# Patient Record
Sex: MALE | URBAN - METROPOLITAN AREA
[De-identification: names, ages, dates, MRNs, and addresses within clinical notes are randomized per-mention and may not be internally consistent; named-entity substitution may affect disease eponyms.]

---

## 2018-06-05 ENCOUNTER — APPOINTMENT (RX ONLY)
Dept: URBAN - METROPOLITAN AREA CLINIC 23 | Facility: CLINIC | Age: 58
Setting detail: DERMATOLOGY
End: 2018-06-05

## 2018-06-05 DIAGNOSIS — L82.0 INFLAMED SEBORRHEIC KERATOSIS: ICD-10-CM

## 2018-06-05 DIAGNOSIS — L57.0 ACTINIC KERATOSIS: ICD-10-CM

## 2018-06-05 DIAGNOSIS — Z87.2 PERSONAL HISTORY OF DISEASES OF THE SKIN AND SUBCUTANEOUS TISSUE: ICD-10-CM

## 2018-06-05 DIAGNOSIS — Z41.9 ENCOUNTER FOR PROCEDURE FOR PURPOSES OTHER THAN REMEDYING HEALTH STATE, UNSPECIFIED: ICD-10-CM

## 2018-06-05 PROCEDURE — ? LIQUID NITROGEN

## 2018-06-05 PROCEDURE — 99202 OFFICE O/P NEW SF 15 MIN: CPT

## 2018-06-05 PROCEDURE — ? FILLERS

## 2018-06-05 PROCEDURE — ? DYSPORT

## 2018-06-05 PROCEDURE — ? BENIGN DESTRUCTION

## 2018-06-05 PROCEDURE — 17110 DESTRUCTION B9 LES UP TO 14: CPT | Mod: NC

## 2018-06-05 PROCEDURE — ? SUNSCREEN RECOMMENDATIONS

## 2018-06-05 PROCEDURE — 17000 DESTRUCT PREMALG LESION: CPT | Mod: NC

## 2018-06-05 ASSESSMENT — LOCATION ZONE DERM
LOCATION ZONE: EYELID
LOCATION ZONE: NOSE

## 2018-06-05 ASSESSMENT — LOCATION DETAILED DESCRIPTION DERM
LOCATION DETAILED: LEFT MEDIAL SUPERIOR EYELID
LOCATION DETAILED: NASAL TIP

## 2018-06-05 ASSESSMENT — LOCATION SIMPLE DESCRIPTION DERM
LOCATION SIMPLE: NOSE
LOCATION SIMPLE: LEFT SUPERIOR EYELID

## 2018-06-05 NOTE — PROCEDURE: FILLERS
Nasolabial Folds Filler Volume In Cc: 0
Additional Area 2 Location: lateral cheeks, lateral mouth, marionette lines
Expiration Date (Month Year): 07/225/2019
Additional Area 1 Location: perioral fine lines, NLF's, marionette lines
Additional Area 1 Volume In Cc: 0.5
Lot #: [id number]
Map Statment: See [address] for Complete Details
Detail Level: Zone
Additional Area 3 Location: lateral mouth, perioral fine lines
Expiration Date (Month Year): 10/31/2020
Lot #: [id number]
Additional Anesthesia Volume In Cc: 6
Additional Area 4 Location: jawline, fine lines lateral cheeks
Use Map Statement For Sites (Optional): No
Additional Area 5 Location: cheeks with cannula, lateral mouth
Price (Use Numbers Only, No Special Characters Or $): 0.00
Post-Care Instructions: Patient instructed to apply ice to reduce swelling.
Additional Area 1 Location: periorbital fine lines
Filler: Eleni Bates
Anesthesia Volume In Cc: 0.2
Consent: Written consent obtained. Risks include but not limited to bruising, beading, irregular texture, ulceration, infection, allergic reaction, scar formation, incomplete augmentation, temporary nature, procedural pain.

## 2018-06-05 NOTE — PROCEDURE: BENIGN DESTRUCTION
Include Z78.9 (Other Specified Conditions Influencing Health Status) As An Associated Diagnosis?: No
Post-Care Instructions: I reviewed with the patient in detail post-care instructions. Patient is to wear sunprotection, and avoid picking at any of the treated lesions. Pt may apply Vaseline to crusted or scabbing areas.
Treatment Number (Will Not Render If 0): 0
Anesthesia Volume In Cc: 0.5
Medical Necessity Clause: This procedure was medically necessary because the lesions that were treated were:
Consent: The patient's consent was obtained including but not limited to risks of crusting, scabbing, blistering, scarring, darker or lighter pigmentary change, recurrence, incomplete removal and infection.
Detail Level: Zone
Medical Necessity Information: It is in your best interest to select a reason for this procedure from the list below. All of these items fulfill various CMS LCD requirements except the new and changing color options.

## 2018-06-05 NOTE — PROCEDURE: SUNSCREEN RECOMMENDATIONS

## 2018-06-05 NOTE — PROCEDURE: DYSPORT
Nasal Root Units: 0
Topical Anesthesia?: 3% lidocaine, 6% prilocaine
Additional Area 4 Location: high forehead
Length Of Topical Anesthesia Application (Optional): 15 minutes
Additional Area 3 Location: lateral brows
Detail Level: Simple
Lot #: [id number]
Additional Area 1 Location: lateral eyes
Consent: Written consent obtained. Risks include but not limited to lid/brow ptosis, bruising, swelling, diplopia, temporary effect, incomplete chemical denervation.
Dilution (U/ 0.1cc): 1.5
Additional Area 2 Location: bunny lines
Expiration Date (Month Year): 11/30/2018
Additional Area 1 Units: [address] [facility]
Price (Use Numbers Only, No Special Characters Or $): 0.00
Additional Area 6 Location: neck

## 2019-02-13 ENCOUNTER — APPOINTMENT (RX ONLY)
Dept: URBAN - METROPOLITAN AREA CLINIC 23 | Facility: CLINIC | Age: 59
Setting detail: DERMATOLOGY
End: 2019-02-13

## 2019-02-13 DIAGNOSIS — Z41.9 ENCOUNTER FOR PROCEDURE FOR PURPOSES OTHER THAN REMEDYING HEALTH STATE, UNSPECIFIED: ICD-10-CM

## 2019-02-13 PROCEDURE — ? DYSPORT

## 2019-02-13 PROCEDURE — ? PULSED-DYE LASER

## 2019-02-13 PROCEDURE — ? FILLERS

## 2019-02-13 PROCEDURE — ? RECOMMENDATIONS

## 2019-02-13 NOTE — PROCEDURE: FILLERS
Include Cannula Information In Note?: No
[place] Filler  Volume In Cc: 0
Additional Area 3 Location: lateral cheeks, Nasalabial folds, lateral mouth, Marionette fine lines
Additional Area 1 Location: lateral mouth, lateral cheeks, marionette lines, perioral fine lines
Additional Area 2 Location: vermillion fine lines, nasalabial folds,oral commissure
Additional Area 4 Location: lateral Jawline, Vermillion lip fine lines, tear trough, Nasalabial folds and Chin
Additional Area 1 Location: lateral mouth, fine lines perioral, marionette lines, lateral cheeks, vermillion lips
Anesthesia Type: 1% lidocaine without epinephrine
Additional Area 5 Location: Chvermillion lips, lateral mouth
Additional Area 3 Location: Glabbellar fine lines, Nasalabial folds, Marionette lines, vermillion lip
Additional Area 2 Location: Nasalabial, Glabellar fine lines- Complimentary
Anesthesia Volume In Cc: 0.6
Lot #: [id number]
Additional Anesthesia Volume In Cc: 6
Expiration Date (Month Year): 04/30/2021
Detail Level: Zone
Include Cannula Information In Note?: Yes
Consent: Written consent obtained. Risks include but not limited to bruising, beading, irregular texture, ulceration, infection, allergic reaction, scar formation, incomplete augmentation, temporary nature, procedural pain.
Lot #: [id number]
Include Cannula Brand?: DermaSculpt
Price (Use Numbers Only, No Special Characters Or $): 0.00
Post-Care Instructions: Patient instructed to apply ice to reduce swelling.
Expiration Date (Month Year): 07/31/2021
Lot #: [id number]
Filler: Restylane-L
Include Cannula Size?: 25G
Additional Area 1 Location: tear throughs bilateral
Expiration Date (Month Year): 08/31/2019
Additional Area 1 Volume In Cc: 0.5
Include Cannula Length?: 1.5 inch
Map Statment: See [address] for Complete Details
Additional Area 2 Location: glabella

## 2019-02-13 NOTE — PROCEDURE: DYSPORT
Periorbital Skin Units: 0
Additional Area 5 Location: upper lip cutaneous
Additional Area 2 Units: [address] [facility]
Additional Area 2 Location: glabella
Dilution (U/ 0.1cc): 1.5
Additional Area 6 Location: high forehead 2 cm above brows
Additional Area 3 Location: neck bands
Consent: Written consent obtained. Risks include but not limited to lid/brow ptosis, bruising, swelling, diplopia, temporary effect, incomplete chemical denervation.
Price (Use Numbers Only, No Special Characters Or $): 0.00
Expiration Date (Month Year): 08/31/2019
Lot #: [id number]
Length Of Topical Anesthesia Application (Optional): 15 minutes
Additional Area 1 Units: 60
Detail Level: Simple
Topical Anesthesia?: BLT cream (benzocaine 20%, lidocaine 10%, tetracaine 10%)
Additional Area 1 Location: crows feet

## 2019-02-13 NOTE — HPI: COSMETIC (FILLERS)
Have You Had Fillers Before?: has had fillers
Additional History: No history of cold sores
When Was Your Last Filler Injection?: 06/05/18

## 2019-02-13 NOTE — PROCEDURE: PULSED-DYE LASER
Treated Area: small area
Fluence In J/Cm2 (Optional): 10.0
Delay Time (Ms): 4[address] [facility]
Cryogen Time (Ms): 10
Consent: Written consent obtained, risks reviewed including but not limited to crusting, scabbing, blistering, scarring, darker or lighter pigmentary change, incidental hair removal, bruising, and/or incomplete removal.
Delay Time (Ms): 20
Post-Procedure Care: Sunscreen applied. Post care reviewed with patient.
Spot Size: 10x3 mm
Fluence In J/Cm2 (Optional): 6.50
Spot Size: 7 mm
Location Override: cheeks tear through area post cosmetic
Fluence In J/Cm2 (Optional): 7.50
Pulse Duration: 10 ms
Cryogen Time (Ms): [address] Yey[address] Cardenas
Laser Type: Vbeam 595nm
Post-Care Instructions: I reviewed with the patient in detail post-care instructions. Patient should stay away from the sun and wear sun protection until treated areas are fully healed.
Price (Use Numbers Only, No Special Characters Or $): 0.00
Spot Size: 10 mm
Detail Level: Zone
Cryogen Time (Ms): 27
Cryogen Time (Ms): 30
Pulse Duration: 6 ms

## 2019-02-13 NOTE — PROCEDURE: MIPS QUALITY
Quality 110: Preventive Care And Screening: Influenza Immunization: Influenza Immunization not Administered due to Patient Allergy.
Detail Level: Zone
Quality 474: Zoster Vaccination Status: Shingrix Vaccination not Administered or Previously Received, Reason not Otherwise Specified
Quality 130: Documentation Of Current Medications In The Medical Record: Current Medications Documented

## 2019-02-14 ENCOUNTER — APPOINTMENT (RX ONLY)
Dept: URBAN - METROPOLITAN AREA CLINIC 23 | Facility: CLINIC | Age: 59
Setting detail: DERMATOLOGY
End: 2019-02-14

## 2019-02-14 DIAGNOSIS — Z41.9 ENCOUNTER FOR PROCEDURE FOR PURPOSES OTHER THAN REMEDYING HEALTH STATE, UNSPECIFIED: ICD-10-CM

## 2019-02-14 PROCEDURE — ? PULSED-DYE LASER

## 2019-02-14 NOTE — PROCEDURE: PULSED-DYE LASER
Spot Size: 7 mm
Detail Level: Zone
Fluence In J/Cm2 (Optional): 6.5 j/cm2
Consent: Written consent obtained, risks reviewed including but not limited to crusting, scabbing, blistering, scarring, darker or lighter pigmentary change, incidental hair removal, bruising, and/or incomplete removal.
Delay Time (Ms): 4[address] [facility]
Pulse Duration: 10 ms
Pulse Duration: 6 ms
Post-Procedure Care: Vaseline and ice applied. Post care reviewed with patient.
Spot Size: 10mm pigmented lesion handpiece
Cryogen Time (Ms): [address]0143 Yey[address] Cardenas
Cryogen Time (Ms): 30
Delay Time (Ms): 20
Post-Care Instructions: I reviewed with the patient in detail post-care instructions. Patient should stay away from the sun and wear sun protection until treated areas are fully healed.
Treated Area: small area
Location (Required For Details To Render In Note But Body Touch Will Also Count For First Location): lower lids
Laser Type: Vbeam 595nm

## 2019-07-11 ENCOUNTER — RX ONLY (OUTPATIENT)
Age: 59
Setting detail: RX ONLY
End: 2019-07-11

## 2019-07-11 ENCOUNTER — APPOINTMENT (RX ONLY)
Dept: URBAN - METROPOLITAN AREA CLINIC 23 | Facility: CLINIC | Age: 59
Setting detail: DERMATOLOGY
End: 2019-07-11

## 2019-07-11 DIAGNOSIS — Z41.9 ENCOUNTER FOR PROCEDURE FOR PURPOSES OTHER THAN REMEDYING HEALTH STATE, UNSPECIFIED: ICD-10-CM

## 2019-07-11 PROCEDURE — ? FILLERS

## 2019-07-11 PROCEDURE — ? DYSPORT

## 2019-07-11 RX ORDER — DICLOFENAC SODIUM 30 MG/G
GEL TOPICAL
Qty: 1 | Refills: 1 | Status: ACTIVE

## 2019-07-11 NOTE — PROCEDURE: DYSPORT
Nasal Root Units: 0
Additional Area 3 Units: [address]
Expiration Date (Month Year): 1/31/2020
Detail Level: Simple
Additional Area 2 Units: [address] [facility]
Dilution (U/ 0.1cc): 1.5
Additional Area 5 Location: glabella
Additional Area 4 Location: 2cm high forehead
Additional Area 1 Units: 50
Consent: Written consent obtained. Risks include but not limited to lid/brow ptosis, bruising, swelling, diplopia, temporary effect, incomplete chemical denervation.
Price (Use Numbers Only, No Special Characters Or $): 0.00
Additional Area 3 Location: high forehead 3 cm above brows
Lot #: [id number]
Additional Area 1 Location: lateral eyes

## 2019-07-11 NOTE — PROCEDURE: FILLERS
Additional Area 3 Volume In Cc: 0
Additional Anesthesia Volume In Cc: 6
Additional Area 1 Location: lateral mouth, perioral fine lines, vermillion lips, marionette lines
Include Cannula Length?: 1.5 inch
Additional Area 1 Location: lateral mouth, fine lines perioral, marionette lines, lateral cheeks, vermillion lips
Additional Area 1 Location: lateral mouth, marionette lines
Detail Level: Zone
Additional Area 1 Volume In Cc: 0.5
Additional Area 2 Location: Nasalabial, Glabellar fine lines- Complimentary
Additional Area 2 Location: cheeks, jawline, oral commissure
Additional Area 2 Location: Lips, oral commissure, glabellar fine fines
Price (Use Numbers Only, No Special Characters Or $): 0.00
Additional Area 3 Location: Glabbellar fine lines, Nasalabial folds, Marionette lines, vermillion lip
Use Map Statement For Sites (Optional): No
Filler: Restylane-L
Additional Area 4 Location: Perioral
Additional Area 4 Location: cheeks, lateral jawline, medial cheeks fine lines
Additional Area 5 Location: Cheeks, vermillion lips, marionette fine lines, glabellar fine lines, lateral mouth
Lot #: [id number]
Lot #: [id number]
Lot #: [id number]
Map Statment: See [address] for Complete Details
Expiration Date (Month Year): 08/31/2019
Expiration Date (Month Year): 2021-07-31
Expiration Date (Month Year): 09/2021
Anesthesia Type: 1% lidocaine without epinephrine
Include Cannula Brand?: DermaSculpt
Consent: Written consent obtained. Risks include but not limited to bruising, beading, irregular texture, ulceration, infection, allergic reaction, scar formation, incomplete augmentation, temporary nature, procedural pain.
Post-Care Instructions: Patient instructed to apply ice to reduce swelling.
Include Cannula Size?: 25G

## 2019-07-11 NOTE — PROCEDURE: MIPS QUALITY
Detail Level: Detailed
Quality 131: Pain Assessment And Follow-Up: Pain assessment using a standardized tool is documented as negative, no follow-up plan required
Quality 130: Documentation Of Current Medications In The Medical Record: Eligible clinician attests to documenting in the medical record the patient is not eligible for a current list of medications being obtained, updated, or reviewed by the eligible clinician
Additional Notes: 0/10 pain level

## 2019-07-18 ENCOUNTER — APPOINTMENT (RX ONLY)
Dept: URBAN - METROPOLITAN AREA CLINIC 23 | Facility: CLINIC | Age: 59
Setting detail: DERMATOLOGY
End: 2019-07-18

## 2019-07-18 DIAGNOSIS — L57.0 ACTINIC KERATOSIS: ICD-10-CM

## 2019-07-18 PROCEDURE — 96573 PDT DSTR PRMLG LES PHYS/QHP: CPT

## 2019-07-18 PROCEDURE — ? PDT: BLUE

## 2019-07-18 ASSESSMENT — LOCATION ZONE DERM: LOCATION ZONE: FACE

## 2019-07-18 ASSESSMENT — LOCATION DETAILED DESCRIPTION DERM: LOCATION DETAILED: LEFT INFERIOR CENTRAL MALAR CHEEK

## 2019-07-18 ASSESSMENT — LOCATION SIMPLE DESCRIPTION DERM: LOCATION SIMPLE: LEFT CHEEK

## 2019-07-18 NOTE — PROCEDURE: PDT: BLUE
Medical Necessity: Precancerous Lesions
Illumination Time: 16:40
Pre-Procedure Text: The treatment areas were cleaned and prepped in the usual fashion.
Ndc# (Optional): [id number]
Was Levulan/Ameluz Applied On A Previous Day?: No
Consent: Written consent obtained. The risks were reviewed with the patient including but not limited to: pigmentary changes, pain, blistering, scabbing, redness, and the remote possibility of scarring.
Incubation Time: 01:30
Treatment Number: 0
Post-Care Instructions: I reviewed with the patient in detail post-care instructions. Patient is to avoid sunlight for the next 2 days, and wear sun protection. Patients may expect sunburn like redness, discomfort and scabbing.
Detail Level: Zone
Light Source: Brett-U
Show Anesthesia In Plan?: Yes
Number Of Kerasticks/Tubes Billed For: 1
Who Performed The Pdt?: Performed by MD EARNEST, RASHAD or OLEGARIO ([id number])
Anesthesia Type: 1% lidocaine with epinephrine
Which Photosensitizer Was Used: Levulan
Lot # (Optional): [id number]
Expiration Date (Optional): 11/20

## 2019-07-18 NOTE — PROCEDURE: MIPS QUALITY
Quality 131: Pain Assessment And Follow-Up: Pain assessment using a standardized tool is documented as negative, no follow-up plan required
Quality 130: Documentation Of Current Medications In The Medical Record: Eligible clinician attests to documenting in the medical record the patient is not eligible for a current list of medications being obtained, updated, or reviewed by the eligible clinician
Detail Level: Detailed
Additional Notes: 0/10 pain level\\nPt will bring med list

## 2019-10-22 ENCOUNTER — APPOINTMENT (RX ONLY)
Dept: URBAN - METROPOLITAN AREA CLINIC 23 | Facility: CLINIC | Age: 59
Setting detail: DERMATOLOGY
End: 2019-10-22

## 2019-10-22 DIAGNOSIS — Z41.9 ENCOUNTER FOR PROCEDURE FOR PURPOSES OTHER THAN REMEDYING HEALTH STATE, UNSPECIFIED: ICD-10-CM

## 2019-10-22 DIAGNOSIS — L57.0 ACTINIC KERATOSIS: ICD-10-CM

## 2019-10-22 PROCEDURE — ? DYSPORT

## 2019-10-22 PROCEDURE — ? FILLERS

## 2019-10-22 PROCEDURE — 17000 DESTRUCT PREMALG LESION: CPT

## 2019-10-22 PROCEDURE — ? LIQUID NITROGEN

## 2019-10-22 ASSESSMENT — LOCATION DETAILED DESCRIPTION DERM: LOCATION DETAILED: RIGHT MEDIAL ZYGOMA

## 2019-10-22 ASSESSMENT — LOCATION SIMPLE DESCRIPTION DERM: LOCATION SIMPLE: RIGHT ZYGOMA

## 2019-10-22 ASSESSMENT — LOCATION ZONE DERM: LOCATION ZONE: FACE

## 2019-10-22 NOTE — PROCEDURE: MIPS QUALITY
Detail Level: Detailed
Quality 131: Pain Assessment And Follow-Up: Pain assessment using a standardized tool is documented as negative, no follow-up plan required
Quality 130: Documentation Of Current Medications In The Medical Record: Current Medications Documented
Additional Notes: Pain level 0/10

## 2019-10-22 NOTE — PROCEDURE: FILLERS
Anesthesia Type: 1% lidocaine without epinephrine
Lateral Face Filler  Volume In Cc: 0
Lot #: [id number]
Include Cannula Brand?: DermaSculpt
Expiration Date (Month Year): 08/31/2019
Include Cannula Size?: 25G
Include Cannula Information In Note?: No
Include Cannula Length?: 1.5 inch
Additional Anesthesia Volume In Cc: 6
Additional Area 1 Location: lateral mouth, perioral fine lines, vermillion lips, marionette lines
Additional Area 1 Location: lateral mouth , Rt cheek
Additional Area 1 Volume In Cc: 0.5
Additional Area 2 Location: Lips, oral commissure, glabellar fine fines
Consent: Written consent obtained. Risks include but not limited to bruising, beading, irregular texture, ulceration, infection, allergic reaction, scar formation, incomplete augmentation, temporary nature, procedural pain.
Additional Area 2 Location: cheeks, jawline, oral commissure
Additional Area 3 Location: Glabbellar fine lines, Nasalabial folds, Marionette lines, vermillion lip
Post-Care Instructions: Patient instructed to apply ice to reduce swelling.
Additional Area 1 Location: lateral mouth, fine lines perioral, marionette lines, lateral cheeks, vermillion lips
Filler: Restylane-L
Detail Level: Zone
Additional Area 4 Location: Perioral
Additional Area 4 Location: cheeks, lateral jawline, medial cheeks fine lines
Additional Area 2 Location: Nasalabial, Glabellar fine lines- Complimentary
Price (Use Numbers Only, No Special Characters Or $): 0.00
Additional Area 5 Location: Cheeks, vermillion lips, marionette fine lines, glabellar fine lines, lateral mouth
Lot #: [id number]
Lot #: [id number]
Expiration Date (Month Year): 09/2021
Expiration Date (Month Year): 10/31/21
Map Statment: See [address] for Complete Details

## 2019-10-22 NOTE — PROCEDURE: DYSPORT
Lot #: [id number]
Additional Area 1 Location: lat brow
Nasal Root Units: 0
Periorbital Skin Units: [address] [facility]
Forehead Units: 60
Additional Area 3 Location: high forehead 3 cm above brows
Additional Area 2 Location: lateral brows
Additional Area 4 Location: 2cm high forehead
Consent: Written consent obtained. Risks include but not limited to lid/brow ptosis, bruising, swelling, diplopia, temporary effect, incomplete chemical denervation.
Price (Use Numbers Only, No Special Characters Or $): 0.00
Expiration Date (Month Year): 4/30/2020
Detail Level: Simple
Dilution (U/ 0.1cc): 1.5
Additional Area 5 Location: glabella

## 2020-02-11 ENCOUNTER — APPOINTMENT (RX ONLY)
Dept: URBAN - METROPOLITAN AREA CLINIC 23 | Facility: CLINIC | Age: 60
Setting detail: DERMATOLOGY
End: 2020-02-11

## 2020-02-11 DIAGNOSIS — Z41.9 ENCOUNTER FOR PROCEDURE FOR PURPOSES OTHER THAN REMEDYING HEALTH STATE, UNSPECIFIED: ICD-10-CM

## 2020-02-11 PROCEDURE — ? DYSPORT

## 2020-02-11 PROCEDURE — ? FILLERS

## 2020-02-11 NOTE — PROCEDURE: DYSPORT
Dilution (U/ 0.1cc): 1.5
Price (Use Numbers Only, No Special Characters Or $): 0.00
Consent: Written consent obtained. Risks include but not limited to lid/brow ptosis, bruising, swelling, diplopia, temporary effect, incomplete chemical denervation.
Additional Area 1 Units: 0
Periorbital Skin Units: [address] [facility]
Glabellar Complex Units: 60
Detail Level: Simple
Additional Area 3 Location: high forehead 3 cm above brows
Additional Area 4 Location: 2cm high forehead
Expiration Date (Month Year): 4/30/2020
Additional Area 5 Location: glabella
Additional Area 1 Location: lat brow
Lot #: [id number]
Additional Area 2 Location: lateral brows

## 2020-02-11 NOTE — PROCEDURE: FILLERS
Decollete Filler  Volume In Cc: 0
Include Cannula Brand?: DermaSculpt
Include Cannula Information In Note?: No
Additional Area 1 Location: lateral mouth, perioral fine lines, vermillion lips, marionette lines
Additional Area 2 Location: cheeks, jawline, oral commissure
Additional Area 2 Location: Lips, oral commissure, glabellar fine fines
Additional Area 1 Location: lateral jawline, lateral cheeks, NLFâs
Expiration Date (Month Year): 08/31/2019
Additional Area 2 Location: Nasalabial, Glabellar fine lines- Complimentary
Additional Area 1 Location: lateral mouth, fine lines perioral, marionette lines, lateral cheeks, vermillion lips
Detail Level: Zone
Include Cannula Size?: 25G
Additional Area 1 Volume In Cc: 1
Expiration Date (Month Year): 09/2021
Anesthesia Type: 1% lidocaine without epinephrine
Additional Area 5 Location: Cheeks, vermillion lips, marionette fine lines, glabellar fine lines, lateral mouth
Post-Care Instructions: Patient instructed to apply ice to reduce swelling.
Include Cannula Length?: 1.5 inch
Lot #: [id number]
Additional Area 4 Location: Perioral
Additional Area 3 Location: Glabbellar fine lines, Nasalabial folds, Marionette lines, vermillion lip
Price (Use Numbers Only, No Special Characters Or $): 0.00
Map Statment: See [address] for Complete Details
Filler: Restylane-L
Additional Area 4 Location: cheeks, lateral jawline, medial cheeks fine lines
Lot #: [id number]
Lot #: [id number]
Expiration Date (Month Year): 05/31/2022
Additional Anesthesia Volume In Cc: 6
Consent: Written consent obtained. Risks include but not limited to bruising, beading, irregular texture, ulceration, infection, allergic reaction, scar formation, incomplete augmentation, temporary nature, procedural pain.